# Patient Record
(demographics unavailable — no encounter records)

---

## 2024-11-01 NOTE — FAMILY HISTORY
[FreeTextEntry1] : Father: Depression (not formerly diagnosed), anger, alcohol abuse \par  Mother: Anxiety, Borderline Personality Disorder.\par  No History of Suicide attempts in the family.

## 2024-11-01 NOTE — PAST MEDICAL HISTORY
[FreeTextEntry1] : One suicide attempt via overdose on Tylenol, one hospitalization [facility], without prior history of psychological or psychiatric treatment. After hospitalization at [facility], participated in DBT program and continued therapy with Alma Delia Hernandez LCSW, after which her care was transferred to resident Dr. Smith. Patient's care then transferred to Dr. Teresa.\par  \par  Medical: Chiari Malformation (with surgical intervention at age 19, with prior sx of headaches, pain with neck movements), thyroid nodules requiring biopsy, allergies with hives, ovarian cysts, current idiopathic thrombocytopenia (ITP).

## 2024-11-01 NOTE — DISCUSSION/SUMMARY
[Plan Review] : Plan Review [Able to manage surrounding demands and opportunities] : able to manage surrounding demands and opportunities [Able to exercise self-direction] : able to exercise self-direction [Able to set and pursue goals] : able to set and pursue goals [Adherent to treatment recommendations] : adherent to treatment recommendations [Articulate] : articulate [Attempting to realize their potential] : Attempting to realize their potential [Cognitively intact] : cognitively intact [Insightful] : insightful [Creative] : creative [Intelligent] : intelligent [Motivated to participate in treatment] : motivated to participate in treatment [Motivated and ready for change] : motivated and ready for change [Health literacy] : health literacy [Motivated to maintain or improve physical health] : motivated to maintain or improve physical health [In good health] : in good health [Financially stable] : financially stable [Has vocational interests or hobbies] : has vocational interests or hobbies [Has a supportive network] : has a supportive network [Housing stability] : housing stability [English fluency] : English fluency [Connected to healthcare] : connected to healthcare [Good health literacy] : good health literacy [Access to safe outdoor spaces] : access to safe outdoor spaces [Social supports] : social supports [FreeTextEntry2] : 10/30/24 [FreeTextEntry5] : To continue using my DBT stills so that I don't sink back to maladaptive habits or thinking. [Mental Health] : Mental Health [Interpersonal Relationships] : Interpersonal Relationships [Initial] : Initial [every ___ months] : every [unfilled] months [Psychiatric Provider/Prescriber] : Psychiatric Provider/Prescriber [FreeTextEntry4] : To be able to manage when my sister does not understand me. [FreeTextEntry1] : Pt left voicemail stating that she needed to cancel 10 am appointment on  of death in family and  proceedings. Provider left pt voicemail offering Nov 8at 9am and  at 9:30 am as alternatives

## 2024-11-01 NOTE — SOCIAL HISTORY
[Alone] : lives alone [Employed] : employed [Never ] : never  [FreeTextEntry2] : works as   [FreeTextEntry1] : Currently domiciled alone in [facility] residence, 1 sister (who has 2 children) with strained relationship. 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  [No Known Substance Use] : no known substance use

## 2024-11-01 NOTE — CURRENT PSYCHIATRIC SYMPTOMS
[Depressed Mood] : no depressed mood [Anhedonia] : no anhedonia [Guilt] : not feeling guilty [Decreased Concentration] : no decrease in concentrating ability [Insomnia] : no insomnia disorder [Hypersomnia] : no ~T hypersomnia [Psychomotor Retardation] : no psychomotor retardation [Euphoria] : no euphoria [Highly Irritable] : no high irritability [Increased Activity] : no increased in activity [Distractibility] : not distracted [Grandeur] : no feelings of grandeur [Delusions] : no ~T delusions [Hallucination Visual] : no visual hallucinations [Hallucination Auditory] : no auditory hallucinations [Thought Disorder] : ~T a thought disorder was not noted [Excessive Worry] : no excessive worries [Ruminations] : ruminations [Obsessions] : no obsessions [Panic] : no panic disorder

## 2024-11-01 NOTE — REASON FOR VISIT
[Patient preference] : as per patient preference [Telehealth (audio & video) - Individual/Group] : This visit was provided via telehealth using real-time 2-way audio visual technology. [Medical Office: ([facility])___] : The provider was located at the medical office in [unfilled]. [Home] : The patient, [unfilled], was located at home, [unfilled], at the time of the visit. [Verbal consent obtained from patient/other participant(s)] : Verbal consent for telehealth/telephonic services obtained from patient/other participant(s) [FreeTextEntry4] : 11:04 [FreeTextEntry5] : 11:30 [Patient] : Patient [Follow-Up Visit] : a follow-up visit [FreeTextEntry1] : Therapy and medication management

## 2024-11-01 NOTE — HISTORY OF PRESENT ILLNESS
[FreeTextEntry1] : Pt assessed over [facility] for 26 minutes.  Patient appears well groomed, pleasant and euthymic to interview. She states that she feels "better now", stating that for the past 1.5 weeks her mood has been good and stable, but that prior to that she had 1-2 weeks of what she describes as a "borderline episode." Discussed that much of this was triggered by a budidng romantic relationship, and her fears of abandonment that were triggered by it. Writer provided supportive therapy. Patient states that she now is back to her workout routine, feels somewhat better. Mentions that she feels like her Wellbutrin does not give her the energy/motivation boost it used to ; discussed option of possibly changing medication in the future but also the importance of behavioral activation and exercise. Patient denies any acute changes in her mood, denies any symptoms of decompensated ailyn, psychosis, or depression. Denies any NSSIB or SI. Denies any adverse effects on her current medication.

## 2024-11-01 NOTE — PAST MEDICAL HISTORY
[FreeTextEntry1] : One suicide attempt via overdose on Tylenol, one hospitalization [facility], without prior history of psychological or psychiatric treatment. After hospitalization at [facility], participated in DBT program and continued therapy with Alma Delia Hernandez LCSW, after which her care was transferred to resident Dr. Smith. Patient's care then transferred to Dr. Teresa.\par  \par  Medical: Chiari Malformation (with surgical intervention at age 19, with prior sx of headaches, pain with neck movements), thyroid nodules requiring biopsy, allergies with hives, ovarian cysts, current idiopathic thrombocytopenia (ITP).

## 2024-11-01 NOTE — PHYSICAL EXAM
[None] : none [FreeTextEntry2] : not assessed, telado visit [FreeTextEntry3] : not assessed, teladoc visit [Well groomed] : well groomed [Cooperative] : cooperative [Euthymic] : euthymic [Full] : full [Clear] : clear [Linear/Goal Directed] : linear/goal directed [None Reported] : none reported [Average] : average [WNL] : within normal limits [FreeTextEntry1] : well groomed, good eye contact, NAD [FreeTextEntry7] : self reflective, self aware

## 2024-11-01 NOTE — REASON FOR VISIT
[Patient preference] : as per patient preference [Telehealth (audio & video) - Individual/Group] : This visit was provided via telehealth using real-time 2-way audio visual technology. [Medical Office: ([facility])___] : The provider was located at the medical office in [unfilled]. [Home] : The patient, [unfilled], was located at home, [unfilled], at the time of the visit. [Verbal consent obtained from patient/other participant(s)] : Verbal consent for telehealth/telephonic services obtained from patient/other participant(s) [FreeTextEntry4] : 11:04 [FreeTextEntry5] : 11:30 [Patient] : Patient [Follow-Up Visit] : a follow-up visit [FreeTextEntry1] : Therapy and medication management

## 2024-11-01 NOTE — HISTORY OF PRESENT ILLNESS
[FreeTextEntry1] : Pt assessed over [facility] for 26 minutes.  Patient appears well groomed, pleasant and euthymic to interview. She states that she feels "better now", stating that for the past 1.5 weeks her mood has been good and stable, but that prior to that she had 1-2 weeks of what she describes as a "borderline episode." Discussed that much of this was triggered by a budidng romantic relationship, and her fears of abandonment that were triggered by it. Writer provided supportive therapy. Patient states that she now is back to her workout routine, feels somewhat better. Mentions that she feels like her Wellbutrin does not give her the energy/motivation boost it used to ; discussed option of possibly changing medication in the future but also the importance of behavioral activation and exercise. Patient denies any acute changes in her mood, denies any symptoms of decompensated ailyn, psychosis, or depression. Denies any NSSIB or SI. Denies any adverse effects on her current medication.

## 2024-11-01 NOTE — RESULTS/DATA
[FreeTextEntry1] : 12/2/19: wnl: CBC, CMP, TSH, utox negative\par  12/2/19: H&P performed inpatient, copy in chart

## 2024-11-01 NOTE — SOCIAL HISTORY
[Alone] : lives alone [Employed] : employed [Never ] : never  [FreeTextEntry2] : works as   [FreeTextEntry1] : Currently domiciled alone in [facility] residence, 1 sister (who has 2 children) with strained relationship. 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  [No Known Substance Use] : no known substance use

## 2024-11-25 NOTE — SOCIAL HISTORY
[Alone] : lives alone [Employed] : employed [Never ] : never  [No Known Substance Use] : no known substance use [FreeTextEntry2] : works as   [FreeTextEntry1] : Currently domiciled alone in [facility] residence, 1 sister (who has 2 children) with strained relationship. 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

## 2024-11-25 NOTE — DISCUSSION/SUMMARY
[Plan Review] : Plan Review [Able to manage surrounding demands and opportunities] : able to manage surrounding demands and opportunities [Able to exercise self-direction] : able to exercise self-direction [Able to set and pursue goals] : able to set and pursue goals [Adherent to treatment recommendations] : adherent to treatment recommendations [Articulate] : articulate [Attempting to realize their potential] : Attempting to realize their potential [Cognitively intact] : cognitively intact [Insightful] : insightful [Creative] : creative [Intelligent] : intelligent [Motivated to participate in treatment] : motivated to participate in treatment [Motivated and ready for change] : motivated and ready for change [Health literacy] : health literacy [Motivated to maintain or improve physical health] : motivated to maintain or improve physical health [In good health] : in good health [Financially stable] : financially stable [Has vocational interests or hobbies] : has vocational interests or hobbies [Has a supportive network] : has a supportive network [Housing stability] : housing stability [English fluency] : English fluency [Connected to healthcare] : connected to healthcare [Good health literacy] : good health literacy [Access to safe outdoor spaces] : access to safe outdoor spaces [Social supports] : social supports [Mental Health] : Mental Health [Interpersonal Relationships] : Interpersonal Relationships [Initial] : Initial [every ___ months] : every [unfilled] months [Psychiatric Provider/Prescriber] : Psychiatric Provider/Prescriber [FreeTextEntry2] : 10/30/24 [FreeTextEntry5] : To continue using my DBT stills so that I don't sink back to maladaptive habits or thinking. [FreeTextEntry4] : To be able to manage when my sister does not understand me. [FreeTextEntry1] : Soledad Hawkins is a 37-year-old, female, domiciled alone in [facility] residence, single, without children, employed , with medical history of thrombocytopenia; psychiatric history of Borderline Personality Disorder, Major Depressive Disorder, hx of suicide attempt; presenting for therapy session and medication management.  She continues to work on acceptance and distress tolerance, especially towards acceptance of her family and their behaviors/actions. Patient demonstrates continued use of DBT skills. She continues to demonstrate strong insight into borderline personality disorder and uses her skills to manage interpersonal difficulties, distress tolerance and anxiety. Self-reflective on how far she has come and is proud of sustained ability to use skills to tolerate stress. Denies anxiety, depression, SI, HI. Reviewed coping skills and some distress tolerance skills.  On follow up today mentions feeling "ok" but noting some increased stressors in the past month including the anniversary of her SA, the upcoming holidays, and her grandmother's death. Used supportive therapy to discuss. Notes recent latent EBV diagnosis may have been contributing to her fatigue. Advocates to eventually decrease and later discontinue her psychiatric medications, discussed to try doing this after the holidays. Patient continues to be very future oriented and treatment focused. .Denies any AE on current medication regimen.   Goal: continued work towards distress tolerance and acceptance, plan to discuss small habitual changes to change her behavior in ways that she would like in regards to her finances, exercise, and eating habits  Plan: Medications need to be prescribed by a Medicaid provider Continue venlafaxine ER HCl 225 mg daily  S/p Sertraline 100 mg PO daily  Continue Trazodone 50 mg PRN qhs for insomnia (takes it several nights a week) Continue Wellbutrin  mg PO Qdaily (prescribed as 300mg + 150mg for insurance reasons); states Wellbutrin is not providing her with the "boost" it used to, may consider changing in the future Discuss taking Omega 3 fatty acids Reviewed CBC, CMP, vitamin D, iron levels, and TSH/T4. Vitamin D is lower end of nml.  Follow up in 4 weeks

## 2024-11-25 NOTE — HISTORY OF PRESENT ILLNESS
[FreeTextEntry1] : Pt assessed over [facility] for 28 minutes.  Patient appears well groomed, pleasant  but mildly dysthymic to interview. She states that she feels "ok" but notes that she had a number of challenges in the past few months, including the death of her grandmother about a month ago, and just now the 5-year anniversary of her suicide attempt. She states that she tries not to dwell on the suicide attempt, because it makes her sad, but that it's hard to think of where she was at that time emotionally and not feel brought down. She does state that she continues to be able to enjoy life, and cope. She states that she has been able to go to work, and get the things done that she needs to. She notes however, that the holiday periods can be a little more stressful for her because of the complicated family dynamics they can bring up. She states that she is planning on having a "low key" Thanksgiving to herself. Finally, she notes that she was diagnosed with latent EBV which she believes may have been contributing to her prior fatigue.  Advocates to eventually decrease and later discontinue her psychiatric medications, discussed to try doing this after the holidays. Patient denies any symptoms of decompensated ailyn, psychosis, or depression. Denies any NSSIB or SI. Denies any adverse effects on her current medication.

## 2024-11-25 NOTE — CURRENT PSYCHIATRIC SYMPTOMS
[Ruminations] : ruminations [Depressed Mood] : no depressed mood [Anhedonia] : no anhedonia [Guilt] : not feeling guilty [Decreased Concentration] : no decrease in concentrating ability [Insomnia] : no insomnia disorder [Hypersomnia] : no ~T hypersomnia [Psychomotor Retardation] : no psychomotor retardation [Euphoria] : no euphoria [Highly Irritable] : no high irritability [Increased Activity] : no increased in activity [Distractibility] : not distracted [Grandeur] : no feelings of grandeur [Delusions] : no ~T delusions [Hallucination Visual] : no visual hallucinations [Hallucination Auditory] : no auditory hallucinations [Thought Disorder] : ~T a thought disorder was not noted [Excessive Worry] : no excessive worries [Obsessions] : no obsessions [Panic] : no panic disorder

## 2024-11-25 NOTE — REASON FOR VISIT
[Patient preference] : as per patient preference [Telehealth (audio & video) - Individual/Group] : This visit was provided via telehealth using real-time 2-way audio visual technology. [Medical Office: ([facility])___] : The provider was located at the medical office in [unfilled]. [Home] : The patient, [unfilled], was located at home, [unfilled], at the time of the visit. [Verbal consent obtained from patient/other participant(s)] : Verbal consent for telehealth/telephonic services obtained from patient/other participant(s) [Patient] : Patient [Follow-Up Visit] : a follow-up visit [FreeTextEntry4] : 1:02 [FreeTextEntry5] : 1:30 [FreeTextEntry1] : Therapy and medication management

## 2024-11-25 NOTE — PHYSICAL EXAM
[None] : none [Well groomed] : well groomed [Cooperative] : cooperative [Euthymic] : euthymic [Full] : full [Clear] : clear [Linear/Goal Directed] : linear/goal directed [None Reported] : none reported [Average] : average [WNL] : within normal limits [FreeTextEntry2] : not assessed, telado visit [FreeTextEntry3] : not assessed, teladoc visit [FreeTextEntry1] : well groomed, good eye contact, NAD [FreeTextEntry7] : self reflective, self aware

## 2024-11-25 NOTE — PAST MEDICAL HISTORY
[FreeTextEntry1] : One suicide attempt via overdose on Tylenol, one hospitalization [facility], without prior history of psychological or psychiatric treatment. After hospitalization at [facility], participated in DBT program and continued therapy with Alma Delia Hernandez LCSW, after which her care was transferred to resident Dr. Smith. Patient's care then transferred to Dr. Teresa.\par  \par  Medical: Chiari Malformation (with surgical intervention at age 19, with prior sx of headaches, pain with neck movements), thyroid nodules requiring biopsy, allergies with hives, ovarian cysts, current idiopathic thrombocytopenia (ITP).

## 2024-12-23 NOTE — SOCIAL HISTORY
[Alone] : lives alone [Employed] : employed [Never ] : never  [No Known Substance Use] : no known substance use [FreeTextEntry2] : works as   [FreeTextEntry1] : Currently domiciled alone in [facility] residence, 1 sister (who has 2 children) with strained relationship. 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

## 2024-12-23 NOTE — PAST MEDICAL HISTORY
Primary Provider:  Davie Davenport MD      History of Present Illness:  22 y/o [place] 50 presents for scheduled procedure. C/o abnormal uterine bleeding - \"I have been bleeding for months. \" Daily VB for the past 5-6 months despite CLAUDIA. This has recently improved to bleeding every 3 days s/p EMB 3/9/17. Bleeding increases as expected during OCP withdrawal.     Has been on OCPs since . Compliant w/ use. On this particular pill nearly 4 months. Finishing current pack and planning to switch to Chamizo as prescribed at last visit. AUB previously relieved w/ 1 month of climara 0.1. Tried this recently but the bleeding worsened. Menstrual cramping worse on current OCP. Not using condoms. No h/o STDs. Has had 2 partners. Labs 11: A positive, Ab negative  Labs 4/7/15: TSH 2.13, ferritin 118, H/H 14.6, 42,   Labs 6/16/15: VW Ag 122, VW activity 114, PT 11, PTT 31.5, INR 1  GC/CT 10/24/16: neg/neg  Labs 16: WBC 9.7, H/H 14.6/43, , TSH 2.85    Pelvic US 16: Uterus 6.9cm, anteverted, homogeneous. EM 12mm. ROV 2.6cm. LOV 2.5cm. No free fluid. EMB 3/9/17: POLYPOID FRAGMENTS OF PROLIFERATIVE TYPE ENDOMETRIUM SUGGESTIVE OF  BENIGN ENDOMETRIAL POLYPS. NO HYPERPLASIA OR CARCINOMA. Pelvic US 3/22/17: Uterus 7.3 cm, anteverted. Endometrium 1.8 mm. Normal ovaries. No free fluid. Vital Signs   21Years Old Female  Height:  62 inches    Past Pregnancy History   : 1  Para:     0  Aborta:  1  Term: 0, Premature: 0, Living Children: 0, Vaginal Deliveries: 0, C-Sections: 0, Elect. Ab: 1, Ectopics: 0    Gynecologic History   History of abnormal pap: no  Gardasil Injection History: Complete  Pt currently sexually active: yes  Current Contraception: Oral Contraception.    History of STD: no     Allergies    KEFLEX (Critical)      Medications Removed from Medication List          Past Medical History:     Reviewed history from 2017 and no changes required:        Low Vitamin D    Past Surgical History:     Reviewed history from 03/09/2017 and no changes required:        EAB x1 2011        3 foot surgeries 2013,2014        Keldron Teeth    Family History Summary:      Reviewed history Last on 03/22/2017 and no changes required:05/01/2017      General Comments - FH:  Family history transferred to [address] [place] [address]      Social History:     Reviewed history from 03/09/2017 and no changes required:        Single        works at [employer].        See HPI    Except as noted in the HPI, the review of systems is negative for General, Breast, , Resp, GI, Endo, MS, Psych and Heme. General Medical Physical Exam:     General Appearance:       well developed, well nourished, in no acute distress    Head: Inspection:  normocephalic without obvious abnormalities    Eyes:        External:  EOM intact    Ears, Nose, Throat:        External:  normocephalic and atraumatic       Hearing:  grossly intact    Neck:        Neck:  supple    Respiratory:        Resp. effort:  no use of accessory muscles       Auscultation:   no rales, rhonchi, or wheezes    Cardiovascular: Auscultation:   normal S1 and  S2; no murmur, rub, or gallop       Peripheral circ: no cyanosis, clubbing, or edema    Gastrointestinal:        Abdomen:  soft and non-tender; no masses       Liver/spleen:   no enlargement or nodularity    Musculoskeletal:        Gait/station:  normal gait    Skin:        Inspection:  no rashes, suspicious lesions, or ulcerations    Neurological:        Cranial N:  II - XII grossly intact    Psychiatric:       Judgement:  intact       Mood/affect:  no appearance of anxiety, depression, or agitation              Impression & Recommendations:    Problem # 1:  Dysfunctional uterine bleeding (ICD-626.8) (RSP13-Z37.8)  Reviewed HPI, labs, EMB and pelvic US. Some discrepancy between EMB (benign polyps) and pelvic US (EM 1.8mm).    Discussed expectant management for 3-4 months while acclimating to new CLAUDIA vs hysteroscopy/D&C/possible polypectomy. Pt interested in proceeding w/ procedure. Discussed the risk of surgery including infection, hematoma, bleeding, recurrence or persistence of symptoms or need for more extensive surgery, and the risks of anesthesia including myocardial infarction, cerebrovascular accident, sudden death, or reaction to anesthetic medications. The patient understands that the procedure could be an open procedure. The patient expresses her understanding and all questions were answered to the patient's satisfaction. She freely signed the consent. Anticipated post-op course and instructions reviewed. Medications (at conclusion of this visit)    03/09/2017 KARIVA 0.15-0.02/0.01 MG (21/5) ORAL TABS (DESOGESTREL-ETHINYL ESTRADIOL) 1 PO Qday  03/09/2017 DASETTA 1/35 1-35 MG-MCG ORAL TABS (NORETHINDRONE-ETH ESTRADIOL) Prescribed by non 606/706 Richie Shelley MD          Electronically signed by Moi Pierre MD on 05/01/2017 at 1:40 PM    ________________________________________________________________________    Date of Surgery Update:  Chloe Campoverde was seen and examined. History and physical has been reviewed. The patient has been examined.  There have been no significant clinical changes since the completion of the originally dated History and Physical.    Signed By: Moi Pierre MD     May 3, 2017 7:56 AM [FreeTextEntry1] : One suicide attempt via overdose on Tylenol, one hospitalization [facility], without prior history of psychological or psychiatric treatment. After hospitalization at [facility], participated in DBT program and continued therapy with Alma Delia Hernandez LCSW, after which her care was transferred to resident Dr. Smith. Patient's care then transferred to Dr. Teresa.\par  \par  Medical: Chiari Malformation (with surgical intervention at age 19, with prior sx of headaches, pain with neck movements), thyroid nodules requiring biopsy, allergies with hives, ovarian cysts, current idiopathic thrombocytopenia (ITP).

## 2024-12-23 NOTE — DISCUSSION/SUMMARY
[Plan Review] : Plan Review [Able to manage surrounding demands and opportunities] : able to manage surrounding demands and opportunities [Able to exercise self-direction] : able to exercise self-direction [Able to set and pursue goals] : able to set and pursue goals [Adherent to treatment recommendations] : adherent to treatment recommendations [Articulate] : articulate [Attempting to realize their potential] : Attempting to realize their potential [Cognitively intact] : cognitively intact [Insightful] : insightful [Creative] : creative [Intelligent] : intelligent [Motivated to participate in treatment] : motivated to participate in treatment [Motivated and ready for change] : motivated and ready for change [Health literacy] : health literacy [Motivated to maintain or improve physical health] : motivated to maintain or improve physical health [In good health] : in good health [Financially stable] : financially stable [Has vocational interests or hobbies] : has vocational interests or hobbies [Has a supportive network] : has a supportive network [Housing stability] : housing stability [English fluency] : English fluency [Connected to healthcare] : connected to healthcare [Good health literacy] : good health literacy [Access to safe outdoor spaces] : access to safe outdoor spaces [Social supports] : social supports [Mental Health] : Mental Health [Interpersonal Relationships] : Interpersonal Relationships [Initial] : Initial [every ___ months] : every [unfilled] months [Psychiatric Provider/Prescriber] : Psychiatric Provider/Prescriber [FreeTextEntry2] : 12/23/25 [FreeTextEntry5] : To continue using my DBT stills so that I don't sink back to maladaptive habits or thinking. [FreeTextEntry4] : To be able to manage when my sister does not understand me. [FreeTextEntry1] : Soledad Hawkins is a 37-year-old, female, domiciled alone in [facility], single, without children, employed , with medical history of thrombocytopenia; psychiatric history of Borderline Personality Disorder, Major Depressive Disorder, hx of suicide attempt; presenting for therapy session and medication management.  She continues to work on acceptance and distress tolerance, especially towards acceptance of her family and their behaviors/actions. Patient demonstrates continued use of DBT skills. She continues to demonstrate strong insight into borderline personality disorder and uses her skills to manage interpersonal difficulties, distress tolerance and anxiety. Self-reflective on how far she has come and is proud of sustained ability to use skills to tolerate stress. Denies anxiety, depression, SI, HI. Reviewed coping skills and some distress tolerance skills.  On follow up today mentions feeling better, has been introspective about what a meaningful life would be for her, and feels like she's gained some emotional distance from the painful events of the recent months (tension with friend/relationship, anniversary of SA, grandmother's death) Used supportive therapy to discuss. Advocates to eventually decrease and later discontinue her psychiatric medications, discussed to try doing this after the holidays. Patient continues to be very future oriented and treatment focused. .Denies any AE on current medication regimen.   Goal: continued work towards distress tolerance and acceptance, plan to discuss small habitual changes to change her behavior in ways that she would like in regards to her finances, exercise, and eating habits  Plan: Medications need to be prescribed by a Medicaid provider Continue venlafaxine ER HCl 225 mg daily  S/p Sertraline 100 mg PO daily  Continue Trazodone 50 mg PRN qhs for insomnia (takes it several nights a week) Continue Wellbutrin  mg PO Qdaily (prescribed as 300mg + 150mg for insurance reasons); states Wellbutrin is not providing her with the "boost" it used to, may consider changing in the future Discuss taking Omega 3 fatty acids Reviewed CBC, CMP, vitamin D, iron levels, and TSH/T4. Vitamin D is lower end of nml.  Follow up in 4 weeks

## 2024-12-23 NOTE — REASON FOR VISIT
[Patient preference] : as per patient preference [Telehealth (audio & video) - Individual/Group] : This visit was provided via telehealth using real-time 2-way audio visual technology. [Medical Office: ([facility])___] : The provider was located at the medical office in [unfilled]. [Home] : The patient, [unfilled], was located at home, [unfilled], at the time of the visit. [Verbal consent obtained from patient/other participant(s)] : Verbal consent for telehealth/telephonic services obtained from patient/other participant(s) [Patient] : Patient [Follow-Up Visit] : a follow-up visit [FreeTextEntry4] : 1:34 [FreeTextEntry5] : 1:55 [FreeTextEntry1] : Therapy and medication management

## 2024-12-23 NOTE — HISTORY OF PRESENT ILLNESS
[FreeTextEntry1] : Pt assessed over [facility] for 27 minutes.  Patient appears well groomed, pleasant  but and more euthymic than last visit to interview. She states that she has been feeling better, both in terms of her relationship/friendship, and the anniversary of her suicide attempt. States that she has been thinking about what it means to live a full, meaningful life and that it does not necessarily include having a romantic partner (is receiving a lot of pressure from family to find someone). Has been engaged in DBT podcsts such as [facility], and finding that work inspirational (thinks she may want to contribute to the BPD/mental health community one day). Again advocates to eventually decrease and later discontinue her psychiatric medications, discussed to try doing this after the holidays. Patient denies any symptoms of decompensated ailyn, psychosis, or depression. Denies any NSSIB or SI. Denies any adverse effects on her current medication.

## 2025-01-17 NOTE — SOCIAL HISTORY
[Alone] : lives alone [Employed] : employed [Never ] : never  [No Known Substance Use] : no known substance use [FreeTextEntry2] : works as   [FreeTextEntry1] : Currently domiciled alone in [facility] residence, 1 sister (who has 2 children) with strained relationship. 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

## 2025-01-17 NOTE — REASON FOR VISIT
[Patient preference] : as per patient preference [Telehealth (audio & video) - Individual/Group] : This visit was provided via telehealth using real-time 2-way audio visual technology. [Medical Office: ([facility])___] : The provider was located at the medical office in [unfilled]. [Home] : The patient, [unfilled], was located at home, [unfilled], at the time of the visit. [Verbal consent obtained from patient/other participant(s)] : Verbal consent for telehealth/telephonic services obtained from patient/other participant(s) [Patient] : Patient [Follow-Up Visit] : a follow-up visit [FreeTextEntry4] : 11:00 [FreeTextEntry5] : 11:30 [FreeTextEntry1] : Therapy and medication management

## 2025-01-17 NOTE — HISTORY OF PRESENT ILLNESS
[FreeTextEntry1] : Pt assessed over [employer] for 30 minutes.  Patient appears well groomed, pleasant  but more dysthymic and constricted than on last visit. She states that though she does not feel actively depressed, she continues to feel like she has low motivation, and does not have figueroa in engaging in the kinds of activities that used to give her figueroa. She states that she has been trying to engage exercise and the other activities that usually work to get her out of this mood state, but that it's been stable for the past couple of months and does not seem to be changing. Provider discussed role of omega 3 fatty acids. Patient ammendable to switching Effexor to Prozac. Patient denies any symptoms of decompensated ailyn, psychosis, or depression. Denies any NSSIB or SI. Denies any adverse effects on her current medication.

## 2025-01-17 NOTE — CURRENT PSYCHIATRIC SYMPTOMS
[Ruminations] : ruminations [Depressed Mood] : no depressed mood [Anhedonia] : no anhedonia [Guilt] : not feeling guilty [Decreased Concentration] : no decrease in concentrating ability [Insomnia] : no insomnia disorder [Hypersomnia] : no ~T hypersomnia [Psychomotor Agitation] : no agitation [Psychomotor Retardation] : no psychomotor retardation [Euphoria] : no euphoria [Highly Irritable] : no high irritability [Increased Activity] : no increased in activity [Distractibility] : not distracted [Grandeur] : no feelings of grandeur [Delusions] : no ~T delusions [Hallucination Visual] : no visual hallucinations [Hallucination Auditory] : no auditory hallucinations [Thought Disorder] : ~T a thought disorder was not noted [Excessive Worry] : no excessive worries [Obsessions] : no obsessions [Panic] : no panic disorder [employer]

## 2025-01-17 NOTE — PAST MEDICAL HISTORY
[FreeTextEntry1] : One suicide attempt via overdose on Tylenol, one hospitalization [facility], without prior history of psychological or psychiatric treatment. After hospitalization at [facility], participated in DBT program and continued therapy with Alma Delia Hernandez LCSW, after which her care was transferred to resident Dr. Smith. Patient's care then transferred to Dr. Teresa.\par  \par  Medical: Chiari Malformation (with surgical intervention at age 19, with prior sx of headaches, pain with neck movements), thyroid nodules requiring biopsy, allergies with hives, ovarian cysts, current idiopathic thrombocytopenia (ITP).

## 2025-01-17 NOTE — PHYSICAL EXAM
[None] : none [Well groomed] : well groomed [Cooperative] : cooperative [Clear] : clear [Linear/Goal Directed] : linear/goal directed [None Reported] : none reported [Average] : average [WNL] : within normal limits [FreeTextEntry2] : not assessed, telado visit [FreeTextEntry3] : not assessed, [employer]adoc visit [Constricted] : constricted [FreeTextEntry1] : well groomed, good eye contact, NAD [FreeTextEntry8] : Dysthymic, anhedonic [FreeTextEntry7] : self reflective, self aware

## 2025-01-17 NOTE — DISCUSSION/SUMMARY
[Plan Review] : Plan Review [Able to manage surrounding demands and opportunities] : able to manage surrounding demands and opportunities [Able to exercise self-direction] : able to exercise self-direction [Able to set and pursue goals] : able to set and pursue goals [Adherent to treatment recommendations] : adherent to treatment recommendations [Articulate] : articulate [Attempting to realize their potential] : Attempting to realize their potential [Cognitively intact] : cognitively intact [Insightful] : insightful [Creative] : creative [Intelligent] : intelligent [Motivated to participate in treatment] : motivated to participate in treatment [Motivated and ready for change] : motivated and ready for change [Health literacy] : health literacy [Motivated to maintain or improve physical health] : motivated to maintain or improve physical health [In good health] : in good health [Financially stable] : financially stable [Has vocational interests or hobbies] : has vocational interests or hobbies [Has a supportive network] : has a supportive network [Housing stability] : housing stability [English fluency] : English fluency [Connected to healthcare] : connected to healthcare [Good health literacy] : good health literacy [Access to safe outdoor spaces] : access to safe outdoor spaces [Social supports] : social supports [Mental Health] : Mental Health [Interpersonal Relationships] : Interpersonal Relationships [Initial] : Initial [every ___ months] : every [unfilled] months [Psychiatric Provider/Prescriber] : Psychiatric Provider/Prescriber [FreeTextEntry2] : 12/23/25 [FreeTextEntry5] : To continue using my DBT stills so that I don't sink back to maladaptive habits or thinking. [FreeTextEntry4] : To be able to manage when my sister does not understand me. [FreeTextEntry1] : Soledad Hawkins is a 37-year-old, female, domiciled alone in [facility], single, without children, employed , with medical history of thrombocytopenia; psychiatric history of Borderline Personality Disorder, Major Depressive Disorder, hx of suicide attempt; presenting for therapy session and medication management.  She continues to work on acceptance and distress tolerance, especially towards acceptance of her family and their behaviors/actions. Patient demonstrates continued use of DBT skills. She continues to demonstrate strong insight into borderline personality disorder and uses her skills to manage interpersonal difficulties, distress tolerance and anxiety. Self-reflective on how far she has come and is proud of sustained ability to use skills to tolerate stress. Denies anxiety, depression, SI, HI. Reviewed coping skills and some distress tolerance skills.  On follow up today pt appears more dysthymic and constricted than on last visit. She states that though she does not feel actively depressed, she has low motivation and anhedonia. Provider discussed role of omega 3 fatty acids. Patient amendable to switching Effexor to Prozac, risks and benefits discussed.  Patient continues to be very future oriented and treatment focused. .Denies any AE on current medication regimen.   Goal: continued work towards distress tolerance and acceptance, plan to discuss small habitual changes to change her behavior in ways that she would like in regards to her finances, exercise, and eating habits  Plan: Medications need to be prescribed by a Medicaid provider Decrese venlafaxine ER HCl to 150 mg daily (plan to discontinue at next appointment) Start Prozac 20 mg daily (plan to increase to 40 mg at next appointment) Continue Trazodone 50 mg PRN qhs for insomnia (takes it several nights a week) Continue Wellbutrin  mg PO Qdaily (prescribed as 300mg + 150mg for insurance reasons); states Wellbutrin is not providing her with the "boost" it used to, may consider changing in the future Discussed taking Omega 3 fatty acids S/p Sertraline 100 mg PO daily  Reviewed CBC, CMP, vitamin D, iron levels, and TSH/T4. Vitamin D is lower end of nml.  Follow up in 2 weeks

## 2025-01-31 NOTE — DISCUSSION/SUMMARY
[FreeTextEntry1] : Pt no-show for today's appointment. Writer left  requesting that patient call back to reschedule

## 2025-02-06 NOTE — HISTORY OF PRESENT ILLNESS
[FreeTextEntry1] : Pt assessed over [employer] for 22 minutes.  Patient appears well groomed, pleasant, less dysthymic and constricted than on last visit. She states her mood has been "ok" but notes currently being sick with a URI. She states that she feels her sleep quality has somewhat decreased during the transition to Prozac, but that she is agreeable to continuing the switch Effexor to Prozac. 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 Patient denies any symptoms of decompensated ailyn, psychosis, or depression. Denies any NSSIB or SI. Denies any adverse effects on her current medication.

## 2025-02-06 NOTE — PHYSICAL EXAM
[None] : none [Well groomed] : well groomed [Cooperative] : cooperative [Constricted] : constricted [Clear] : clear [Linear/Goal Directed] : linear/goal directed [None Reported] : none reported [Average] : average [WNL] : within normal limits [FreeTextEntry2] : not assessed, telado visit [FreeTextEntry3] : not assessed, [employer]adoc visit [FreeTextEntry1] : well groomed, good eye contact, NAD [FreeTextEntry8] : "ok" [de-identified] : less constricted and less dysthymic than previously [FreeTextEntry7] : self reflective, self aware

## 2025-02-06 NOTE — PAST MEDICAL HISTORY
[FreeTextEntry1] : One suicide attempt via overdose on Tylenol, one hospitalization [facility], without prior history of psychological or psychiatric treatment. After hospitalization at [facility], participated in DBT program and continued therapy with Alma Delia Hernandez LCSW, after which her care was transferred to resident Dr. Smith. Patient's care then transferred to Dr. Teresa.\par  \par  Medical: Chiari Malformation (with surgical intervention at age 19, with prior sx of headaches, pain with neck movements), thyroid nodules requiring biopsy, allergies with hives, ovarian cysts, current idiopathic thrombocytopenia (ITP).

## 2025-02-06 NOTE — CURRENT PSYCHIATRIC SYMPTOMS
[Ruminations] : ruminations [Depressed Mood] : no depressed mood [Anhedonia] : no anhedonia [Guilt] : not feeling guilty [Decreased Concentration] : no decrease in concentrating ability [Insomnia] : insomnia [Hypersomnia] : no ~T hypersomnia [Psychomotor Retardation] : no psychomotor retardation [Euphoria] : no euphoria [Highly Irritable] : no high irritability [Increased Activity] : no increased in activity [Distractibility] : not distracted [Grandeur] : no feelings of grandeur [Delusions] : no ~T delusions [Hallucination Visual] : no visual hallucinations [Hallucination Auditory] : no auditory hallucinations [Thought Disorder] : ~T a thought disorder was not noted [Excessive Worry] : no excessive worries [Obsessions] : no obsessions [Panic] : no panic disorder

## 2025-02-06 NOTE — REASON FOR VISIT
[Patient preference] : as per patient preference [Telehealth (audio & video) - Individual/Group] : This visit was provided via telehealth using real-time 2-way audio visual technology. [Medical Office: ([facility])___] : The provider was located at the medical office in [unfilled]. [Home] : The patient, [unfilled], was located at home, [unfilled], at the time of the visit. [Verbal consent obtained from patient/other participant(s)] : Verbal consent for telehealth/telephonic services obtained from patient/other participant(s) [Patient] : Patient [Follow-Up Visit] : a follow-up visit [FreeTextEntry4] : 10:38 [FreeTextEntry5] : 11:00 [FreeTextEntry1] : Therapy and medication management

## 2025-02-06 NOTE — SOCIAL HISTORY
[Alone] : lives alone [Employed] : employed [Never ] : never  [No Known Substance Use] : no known substance use [FreeTextEntry2] : works as   [FreeTextEntry1] : Currently domiciled alone in [facility] residence, 1 sister (who has 2 children) with strained relationship. 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

## 2025-02-06 NOTE — DISCUSSION/SUMMARY
[Plan Review] : Plan Review [Able to manage surrounding demands and opportunities] : able to manage surrounding demands and opportunities [Able to exercise self-direction] : able to exercise self-direction [Able to set and pursue goals] : able to set and pursue goals [Adherent to treatment recommendations] : adherent to treatment recommendations [Articulate] : articulate [Attempting to realize their potential] : Attempting to realize their potential [Cognitively intact] : cognitively intact [Insightful] : insightful [Creative] : creative [Intelligent] : intelligent [Motivated to participate in treatment] : motivated to participate in treatment [Motivated and ready for change] : motivated and ready for change [Health literacy] : health literacy [Motivated to maintain or improve physical health] : motivated to maintain or improve physical health [In good health] : in good health [Financially stable] : financially stable [Has vocational interests or hobbies] : has vocational interests or hobbies [Has a supportive network] : has a supportive network [Housing stability] : housing stability [English fluency] : English fluency [Connected to healthcare] : connected to healthcare [Good health literacy] : good health literacy [Access to safe outdoor spaces] : access to safe outdoor spaces [Social supports] : social supports [Mental Health] : Mental Health [Interpersonal Relationships] : Interpersonal Relationships [Initial] : Initial [every ___ months] : every [unfilled] months [Psychiatric Provider/Prescriber] : Psychiatric Provider/Prescriber [FreeTextEntry2] : 12/23/25 [FreeTextEntry5] : To continue using my DBT stills so that I don't sink back to maladaptive habits or thinking. [FreeTextEntry4] : To be able to manage when my sister does not understand me. [FreeTextEntry1] : Soledad Hawkins is a 37-year-old, female, domiciled alone in [facility], single, without children, employed , with medical history of thrombocytopenia; psychiatric history of Borderline Personality Disorder, Major Depressive Disorder, hx of suicide attempt; presenting for therapy session and medication management.  She continues to work on acceptance and distress tolerance, especially towards acceptance of her family and their behaviors/actions. Patient demonstrates continued use of DBT skills. She continues to demonstrate strong insight into borderline personality disorder and uses her skills to manage interpersonal difficulties, distress tolerance and anxiety. Self-reflective on how far she has come and is proud of sustained ability to use skills to tolerate stress. Denies anxiety, depression, SI, HI. Reviewed coping skills and some distress tolerance skills.  On follow up today pt appears less dysthymic and constricted than on last visit. 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 Patient amendable continuing switch from Effexor to Prozac, risks and benefits discussed. At this time will stop Effexor and increase prozac to 40 mg. Patient continues to be very future oriented and treatment focused. .Denies any AE on current medication regimen.   Goal: continued work towards distress tolerance and acceptance, plan to discuss small habitual changes to change her behavior in ways that she would like in regards to her finances, exercise, and eating habits  Plan: Medications need to be prescribed by a Medicaid provider Stop venlafaxine ER HCl  Increase Prozac to 40 mg daily  Continue Trazodone 50 mg PRN qhs for insomnia (takes it several nights a week) Continue Wellbutrin  mg PO Qdaily (prescribed as 300mg + 150mg for insurance reasons); states Wellbutrin is not providing her with the "boost" it used to, may consider changing in the future Discussed taking Omega 3 fatty acids S/p Sertraline 100 mg PO daily  Reviewed CBC, CMP, vitamin D, iron levels, and TSH/T4. Vitamin D is lower end of nml.  Follow up in 3 weeks

## 2025-02-28 NOTE — CURRENT PSYCHIATRIC SYMPTOMS
[Insomnia] : insomnia [Ruminations] : ruminations [Depressed Mood] : no depressed mood [Anhedonia] : no anhedonia [Guilt] : not feeling guilty [Decreased Concentration] : no decrease in concentrating ability [Hypersomnia] : no ~T hypersomnia [Psychomotor Retardation] : no psychomotor retardation [Euphoria] : no euphoria [Highly Irritable] : no high irritability [Increased Activity] : no increased in activity [Distractibility] : not distracted [Grandeur] : no feelings of grandeur [Delusions] : no ~T delusions [Hallucination Visual] : no visual hallucinations [Hallucination Auditory] : no auditory hallucinations [Thought Disorder] : ~T a thought disorder was not noted [Excessive Worry] : no excessive worries [Obsessions] : no obsessions [Panic] : no panic disorder

## 2025-02-28 NOTE — REASON FOR VISIT
[Patient preference] : as per patient preference [Telehealth (audio & video) - Individual/Group] : This visit was provided via telehealth using real-time 2-way audio visual technology. [Medical Office: ([facility])___] : The provider was located at the medical office in [unfilled]. [Home] : The patient, [unfilled], was located at home, [unfilled], at the time of the visit. [Verbal consent obtained from patient/other participant(s)] : Verbal consent for telehealth/telephonic services obtained from patient/other participant(s) [Patient] : Patient [Follow-Up Visit] : a follow-up visit [FreeTextEntry4] : 10:38 [FreeTextEntry5] : 11:00 [FreeTextEntry1] : Therapy and medication management

## 2025-02-28 NOTE — HISTORY OF PRESENT ILLNESS
[FreeTextEntry1] : Pt assessed over Tel[employer] for 22 minutes.  Patient appears well groomed, pleasant, less dysthymic and constricted than on last visit. She states her mood has been "better" and that she has more energy, but that it is still somewhat low. Discussed behavioral strategies for increasing the amount she exercises. Patient denies any symptoms of decompensated ailyn, psychosis, or depression. Denies any NSSIB or SI. Denies any adverse effects on her current medication.

## 2025-02-28 NOTE — PHYSICAL EXAM
[None] : none [Well groomed] : well groomed [Cooperative] : cooperative [Clear] : clear [Linear/Goal Directed] : linear/goal directed [None Reported] : none reported [Average] : average [WNL] : within normal limits [FreeTextEntry2] : not assessed, telado visit [FreeTextEntry3] : not assessed, teladoc visit [Euthymic] : euthymic [Full] : full [FreeTextEntry1] : well groomed, good eye contact, NAD [FreeTextEntry8] : "good" [FreeTextEntry7] : self reflective, self aware [2 - Borderline mentally ill] : 2 - Borderline mentally ill (subtle or suspected pathology) [2 - Much improved] : 2 - Much improved  (notably better with significant reduction of symptoms; increase in the level of functioning but some symptoms remain)

## 2025-02-28 NOTE — SOCIAL HISTORY
[Alone] : lives alone [Employed] : employed [Never ] : never  [No Known Substance Use] : no known substance use [FreeTextEntry2] : works as   [FreeTextEntry1] : Currently domiciled alone in [facility] residence, 1 sister (who has 2 children) with strained relationship. 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

## 2025-02-28 NOTE — PAST MEDICAL HISTORY
[FreeTextEntry1] : One suicide attempt via overdose on Tylenol, one hospitalization [facility], without prior history of psychological or psychiatric treatment. After hospitalization at [facility], participated in DBT program and continued therapy with Alma Delia Hernandez LCSW, after which her care was transferred to resident Dr. Smith. Patient's care then transferred to Dr. Teresa.\par  \par  Medical: Chiari Malformation (with surgical intervention at age 19, with prior sx of headaches, pain with neck movements), thyroid nodules requiring biopsy, allergies with hives, ovarian cysts, current idiopathic thrombocytopenia (ITP).

## 2025-02-28 NOTE — DISCUSSION/SUMMARY
[Plan Review] : Plan Review [Able to manage surrounding demands and opportunities] : able to manage surrounding demands and opportunities [Able to exercise self-direction] : able to exercise self-direction [Able to set and pursue goals] : able to set and pursue goals [Adherent to treatment recommendations] : adherent to treatment recommendations [Articulate] : articulate [Attempting to realize their potential] : Attempting to realize their potential [Cognitively intact] : cognitively intact [Insightful] : insightful [Creative] : creative [Intelligent] : intelligent [Motivated to participate in treatment] : motivated to participate in treatment [Motivated and ready for change] : motivated and ready for change [Health literacy] : health literacy [Motivated to maintain or improve physical health] : motivated to maintain or improve physical health [In good health] : in good health [Financially stable] : financially stable [Has vocational interests or hobbies] : has vocational interests or hobbies [Has a supportive network] : has a supportive network [Housing stability] : housing stability [English fluency] : English fluency [Connected to healthcare] : connected to healthcare [Good health literacy] : good health literacy [Access to safe outdoor spaces] : access to safe outdoor spaces [Social supports] : social supports [Mental Health] : Mental Health [Interpersonal Relationships] : Interpersonal Relationships [Initial] : Initial [every ___ months] : every [unfilled] months [Psychiatric Provider/Prescriber] : Psychiatric Provider/Prescriber [FreeTextEntry2] : 12/23/25 [FreeTextEntry5] : To continue using my DBT stills so that I don't sink back to maladaptive habits or thinking. [FreeTextEntry4] : To be able to manage when my sister does not understand me. [FreeTextEntry1] : Soledad Hawkins is a 37-year-old, female, [facility] [facility] [place] [facility], single, without children, employed , with medical history of thrombocytopenia; psychiatric history of Borderline Personality Disorder, Major Depressive Disorder, hx of suicide attempt; presenting for therapy session and medication management.  She continues to work on acceptance and distress tolerance, especially towards acceptance of her family and their behaviors/actions. Patient demonstrates continued use of DBT skills. She continues to demonstrate strong insight into borderline personality disorder and uses her skills to manage interpersonal difficulties, distress tolerance and anxiety. Self-reflective on how far she has come and is proud of sustained ability to use skills to tolerate stress. Denies anxiety, depression, SI, HI. Reviewed coping skills and some distress tolerance skills.  On follow up today pt appears euthymic, tolerating prozac to 40 mg well. Discussed strategies for building and sustaining habits, especially around exercise.  Patient continues to be very future oriented, and treatment focused. Denies any AE on current medication regimen.   Goal: continued work towards distress tolerance and acceptance, plan to discuss small habitual changes to change her behavior in ways that she would like in regards to her finances, exercise, and eating habits  Plan: Medications need to be prescribed by a Medicaid provider Stop venlafaxine ER HCl  Increase Prozac to 40 mg daily  Continue Trazodone 50 mg PRN qhs for insomnia (takes it several nights a week) Continue Wellbutrin  mg PO Qdaily (prescribed as 300mg + 150mg for insurance reasons); states Wellbutrin is not providing her with the "boost" it used to, may consider changing in the future Discussed taking Omega 3 fatty acids S/p Sertraline 100 mg PO daily  Reviewed CBC, CMP, vitamin D, iron levels, and TSH/T4. Vitamin D is lower end of nml.  Follow up in 3 weeks

## 2025-03-28 NOTE — SOCIAL HISTORY
[Alone] : lives alone [Employed] : employed [Never ] : never  [No Known Substance Use] : no known substance use [FreeTextEntry2] : works as   [FreeTextEntry1] : Currently domiciled alone in [facility] residence, 1 sister (who has 2 children) with strained relationship. 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

## 2025-03-28 NOTE — PHYSICAL EXAM
[None] : none [Well groomed] : well groomed [Cooperative] : cooperative [Euthymic] : euthymic [Full] : full [Clear] : clear [Linear/Goal Directed] : linear/goal directed [None Reported] : none reported [Average] : average [WNL] : within normal limits [2 - Borderline mentally ill] : 2 - Borderline mentally ill (subtle or suspected pathology) [2 - Much improved] : 2 - Much improved  (notably better with significant reduction of symptoms; increase in the level of functioning but some symptoms remain)  [FreeTextEntry2] : not assessed, telado visit [FreeTextEntry3] : not assessed, teladoc visit [FreeTextEntry1] : well groomed, good eye contact, NAD [FreeTextEntry8] : "good" "hard to make myself to stuff" [FreeTextEntry7] : self reflective, self aware

## 2025-03-28 NOTE — HISTORY OF PRESENT ILLNESS
[FreeTextEntry1] : Pt assessed over Teladoc for 30 minutes.   On follow up today pt appears euthymic, tolerating medications well, but continuing to endorse low motivation and energy. Has even more fatigue, increased irritability, short tempered in week prior to period, pt is not on hormonal birth control. Further discussed strategies for building and sustaining habits, especially around exercise.  Discussed potential for starting cytomel vs modafanil. Patient agreeable to obtaining labs prior to next session to R/O underlying medical causes of low energy. Patient continues to be very future oriented, and treatment focused. Denies any AE on current medication regimen.  Patient denies any symptoms of decompensated ailyn, psychosis, or depression. Denies any NSSIB or SI. Denies any adverse effects on her current medication.

## 2025-03-28 NOTE — REASON FOR VISIT
[Patient preference] : as per patient preference [Telehealth (audio & video) - Individual/Group] : This visit was provided via telehealth using real-time 2-way audio visual technology. [Medical Office: ([facility])___] : The provider was located at the medical office in [unfilled]. [Home] : The patient, [unfilled], was located at home, [unfilled], at the time of the visit. [Verbal consent obtained from patient/other participant(s)] : Verbal consent for telehealth/telephonic services obtained from patient/other participant(s) [Patient] : Patient [Follow-Up Visit] : a follow-up visit [FreeTextEntry4] : 10:30 [FreeTextEntry5] : 11:00 [FreeTextEntry1] : Therapy and medication management

## 2025-03-28 NOTE — DISCUSSION/SUMMARY
[Plan Review] : Plan Review [Able to manage surrounding demands and opportunities] : able to manage surrounding demands and opportunities [Able to exercise self-direction] : able to exercise self-direction [Able to set and pursue goals] : able to set and pursue goals [Adherent to treatment recommendations] : adherent to treatment recommendations [Articulate] : articulate [Attempting to realize their potential] : Attempting to realize their potential [Cognitively intact] : cognitively intact [Insightful] : insightful [Creative] : creative [Intelligent] : intelligent [Motivated to participate in treatment] : motivated to participate in treatment [Motivated and ready for change] : motivated and ready for change [Health literacy] : health literacy [Motivated to maintain or improve physical health] : motivated to maintain or improve physical health [In good health] : in good health [Financially stable] : financially stable [Has vocational interests or hobbies] : has vocational interests or hobbies [Has a supportive network] : has a supportive network [Housing stability] : housing stability [English fluency] : English fluency [Connected to healthcare] : connected to healthcare [Good health literacy] : good health literacy [Access to safe outdoor spaces] : access to safe outdoor spaces [Social supports] : social supports [Mental Health] : Mental Health [Interpersonal Relationships] : Interpersonal Relationships [Initial] : Initial [every ___ months] : every [unfilled] months [Psychiatric Provider/Prescriber] : Psychiatric Provider/Prescriber [FreeTextEntry2] : 12/23/25 [FreeTextEntry5] : To continue using my DBT stills so that I don't sink back to maladaptive habits or thinking. [FreeTextEntry4] : To be able to manage when my sister does not understand me. [FreeTextEntry1] : Soledad Hawkins is a 37-year-old, female, domiciled alone in [facility], single, without children, employed , with medical history of thrombocytopenia; psychiatric history of Borderline Personality Disorder, Major Depressive Disorder, hx of suicide attempt; presenting for therapy session and medication management.  She continues to work on acceptance and distress tolerance, especially towards acceptance of her family and their behaviors/actions. Patient demonstrates continued use of DBT skills. She continues to demonstrate strong insight into borderline personality disorder and uses her skills to manage interpersonal difficulties, distress tolerance and anxiety. Self-reflective on how far she has come and is proud of sustained ability to use skills to tolerate stress. Denies anxiety, depression, SI, HI. Reviewed coping skills and some distress tolerance skills.  On follow up today pt appears euthymic, tolerating medications well, but continuing to endorse low motivation and energy. Further discussed strategies for building and sustaining habits, especially around exercise.  Discussed potential for starting cytomel vs modafanil. Patient agreeable to obtaining labs prior to next session to R/O underlying medical causes of low energy. Patient continues to be very future oriented, and treatment focused. Denies any AE on current medication regimen.   Goal: continued work towards distress tolerance and acceptance, plan to discuss small habitual changes to change her behavior in ways that she would like in regards to her finances, exercise, and eating habits  Plan: FU laboratory blood work ordered 3/28 Consider starting cytomel vs modafanil vs stimulant for low energy/motvation and dysthymia Medications need to be prescribed by a Medicaid provider Stop venlafaxine ER HCl  Continue Prozac 40 mg  Continue Trazodone 50 mg PRN qhs for insomnia (takes it several nights a week) Continue Wellbutrin  mg PO Qdaily (prescribed as 300mg + 150mg for insurance reasons); states Wellbutrin is not providing her with the "boost" it used to, may consider changing in the future Discussed taking Omega 3 fatty acids S/p Sertraline 100 mg PO daily,  venlafaxine ER HCl  Follow up in 2 weeks

## 2025-03-28 NOTE — PAST MEDICAL HISTORY
[FreeTextEntry1] : One suicide attempt via overdose on Tylenol, one hospitalization [facility], without prior history of psychological or psychiatric treatment. After hospitalization at [facility], participated in DBT program and continued therapy with Alma Delia Hernandez LCSW, after which her care was transferred to resident Dr. Smith. Patient's care then transferred to Dr. Teresa.\par  \par  Medical: Chiari Malformation (with surgical intervention at age 19, with prior sx of headaches, pain with neck movements), thyroid nodules requiring biopsy, allergies with hives, ovarian cysts, current idiopathic thrombocytopenia (ITP).

## 2025-04-09 NOTE — PAST MEDICAL HISTORY
[FreeTextEntry1] : One suicide attempt via overdose on Tylenol, one hospitalization [facility], without prior history of psychological or psychiatric treatment. After hospitalization at [facility], participated in DBT program and continued therapy with Alma Delia Hernandez LCSW, after which her care was transferred to resident Dr. Smith. Patient's care then transferred to Dr. Teresa.\par  \par  Medical: Chiari Malformation (with surgical intervention at age 19, with prior sx of headaches, pain with neck movements), thyroid nodules requiring biopsy, allergies with hives, ovarian cysts, current idiopathic thrombocytopenia (ITP).

## 2025-04-09 NOTE — SOCIAL HISTORY
[Alone] : lives alone [Employed] : employed [Never ] : never  [No Known Substance Use] : no known substance use [FreeTextEntry2] : works as   [FreeTextEntry1] : Currently domiciled alone in [facility] residence, 1 sister (who has 2 children) with strained relationship. 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

## 2025-04-09 NOTE — HISTORY OF PRESENT ILLNESS
[FreeTextEntry1] : Pt assessed over Teladoc for 30 minutes.   On follow up today pt appears euthymic, tolerating medications well, but continuing to endorse low motivation and energy. States she would like to continue trying behavioral modifications prior to starting a new medication, states she will try obtaining more sunlight in the mornings once the weather improves. Discussed potential benefits of light therapy. States she will track mood and menstrual cycle in order to determine whether pt has PMDD. Labs reviewed with patient. Patient denies any symptoms of decompensated ailyn, psychosis, or depression. Denies any NSSIB or SI. Denies any adverse effects on her current medication.

## 2025-04-09 NOTE — REASON FOR VISIT
[Patient preference] : as per patient preference [Telehealth (audio & video) - Individual/Group] : This visit was provided via telehealth using real-time 2-way audio visual technology. [Medical Office: ([facility])___] : The provider was located at the medical office in [unfilled]. [Home] : The patient, [unfilled], was located at home, [unfilled], at the time of the visit. [Verbal consent obtained from patient/other participant(s)] : Verbal consent for telehealth/telephonic services obtained from patient/other participant(s) [Patient] : Patient [Follow-Up Visit] : a follow-up visit [FreeTextEntry4] : 10:00 [FreeTextEntry5] : 10:30 [FreeTextEntry1] : Therapy and medication management

## 2025-04-09 NOTE — DISCUSSION/SUMMARY
[Plan Review] : Plan Review [Able to manage surrounding demands and opportunities] : able to manage surrounding demands and opportunities [Able to exercise self-direction] : able to exercise self-direction [Able to set and pursue goals] : able to set and pursue goals [Adherent to treatment recommendations] : adherent to treatment recommendations [Articulate] : articulate [Attempting to realize their potential] : Attempting to realize their potential [Cognitively intact] : cognitively intact [Insightful] : insightful [Creative] : creative [Intelligent] : intelligent [Motivated to participate in treatment] : motivated to participate in treatment [Motivated and ready for change] : motivated and ready for change [Health literacy] : health literacy [Motivated to maintain or improve physical health] : motivated to maintain or improve physical health [In good health] : in good health [Financially stable] : financially stable [Has vocational interests or hobbies] : has vocational interests or hobbies [Has a supportive network] : has a supportive network [Housing stability] : housing stability [English fluency] : English fluency [Connected to healthcare] : connected to healthcare [Good health literacy] : good health literacy [Access to safe outdoor spaces] : access to safe outdoor spaces [Social supports] : social supports [Mental Health] : Mental Health [Interpersonal Relationships] : Interpersonal Relationships [Initial] : Initial [every ___ months] : every [unfilled] months [Psychiatric Provider/Prescriber] : Psychiatric Provider/Prescriber [FreeTextEntry2] : 12/23/25 [FreeTextEntry5] : To continue using my DBT stills so that I don't sink back to maladaptive habits or thinking. [FreeTextEntry4] : To be able to manage when my sister does not understand me. [FreeTextEntry1] : Soledad Hawkins is a 37-year-old, female, domiciled alone in [facility], single, without children, employed , with medical history of thrombocytopenia; psychiatric history of Borderline Personality Disorder, Major Depressive Disorder, hx of suicide attempt; presenting for therapy session and medication management.  She continues to work on acceptance and distress tolerance, especially towards acceptance of her family and their behaviors/actions. Patient demonstrates continued use of DBT skills. She continues to demonstrate strong insight into borderline personality disorder and uses her skills to manage interpersonal difficulties, distress tolerance and anxiety. Self-reflective on how far she has come and is proud of sustained ability to use skills to tolerate stress. Denies anxiety, depression, SI, HI. Reviewed coping skills and some distress tolerance skills.  On follow up today pt appears euthymic, tolerating medications well, but continuing to endorse low motivation and energy.. States she would like to continue trying behavioral modifications prior to starting a new medication, states she will try obtaining more sunlight in the mornings once the weather improves. Discussed potential benefits of light therapy. States she will track mood and menstrual cycle in order to determine whether pt has PMDD. Labs reviewed with patient. Patient denies any symptoms of decompensated ailyn, psychosis, or depression. Denies any NSSIB or SI. Denies any adverse effects on her current medication. Patient continues to be very future oriented, and treatment focused. Denies any AE on current medication regimen.   Goal: continued work towards distress tolerance and acceptance, plan to discuss small habitual changes to change her behavior in ways that she would like in regards to her finances, exercise, and eating habits  Plan: Mood and menstrual cycle tracking Consider starting cytomel vs modafanil vs stimulant for low energy/motvation and dysthymia- at this time pt requests to continue behavioral modifications Medications need to be prescribed by a Medicaid provider Continue Prozac 40 mg  Continue Trazodone 50 mg PRN qhs for insomnia (takes it several nights a week) Continue Wellbutrin  mg PO Qdaily (prescribed as 300mg + 150mg for insurance reasons); states Wellbutrin is not providing her with the "boost" it used to, may consider changing in the future Discussed taking Omega 3 fatty acids S/p Sertraline 100 mg PO daily,  venlafaxine ER HCl Bloodwork from April 2025 reviewed with patient  Follow up in 3 weeks

## 2025-04-09 NOTE — PHYSICAL EXAM
[None] : none [Well groomed] : well groomed [Cooperative] : cooperative [Euthymic] : euthymic [Full] : full [Clear] : clear [Linear/Goal Directed] : linear/goal directed [None Reported] : none reported [Average] : average [WNL] : within normal limits [2 - Borderline mentally ill] : 2 - Borderline mentally ill (subtle or suspected pathology) [2 - Much improved] : 2 - Much improved  (notably better with significant reduction of symptoms; increase in the level of functioning but some symptoms remain)  [FreeTextEntry2] : not assessed, telado visit [FreeTextEntry3] : not assessed, teladoc visit [FreeTextEntry1] : well groomed, good eye contact, NAD [FreeTextEntry8] : "ok" [FreeTextEntry7] : self reflective, self aware

## 2025-04-30 NOTE — PAST MEDICAL HISTORY
[FreeTextEntry1] : One suicide attempt via overdose on Tylenol, one hospitalization [facility], without prior history of psychological or psychiatric treatment. After hospitalization at [facility], participated in DBT program and continued therapy with lAma Delia Hernandez LCSW, after which her care was transferred to resident Dr. Smith. Patient's care then transferred to Dr. Teresa.\par  \par  Medical: Chiari Malformation (with surgical intervention at age 19, with prior sx of headaches, pain with neck movements), thyroid nodules requiring biopsy, allergies with hives, ovarian cysts, current idiopathic thrombocytopenia (ITP).

## 2025-04-30 NOTE — REASON FOR VISIT
[Patient preference] : as per patient preference [Telehealth (audio & video) - Individual/Group] : This visit was provided via telehealth using real-time 2-way audio visual technology. [Medical Office: ([facility])___] : The provider was located at the medical office in [unfilled]. [Home] : The patient, [unfilled], was located at home, [unfilled], at the time of the visit. [Verbal consent obtained from patient/other participant(s)] : Verbal consent for telehealth/telephonic services obtained from patient/other participant(s) [Patient] : Patient [Follow-Up Visit] : a follow-up visit [FreeTextEntry4] : 9:30 [FreeTextEntry5] : 10:00 [FreeTextEntry1] : Therapy and medication management

## 2025-04-30 NOTE — PHYSICAL EXAM
[None] : none [Well groomed] : well groomed [Cooperative] : cooperative [Euthymic] : euthymic [Full] : full [Clear] : clear [Linear/Goal Directed] : linear/goal directed [None Reported] : none reported [Average] : average [WNL] : within normal limits [2 - Borderline mentally ill] : 2 - Borderline mentally ill (subtle or suspected pathology) [2 - Much improved] : 2 - Much improved  (notably better with significant reduction of symptoms; increase in the level of functioning but some symptoms remain)  [FreeTextEntry2] : not assessed, telado visit [FreeTextEntry3] : not assessed, teladoc visit [FreeTextEntry1] : well groomed, good eye contact, NAD [FreeTextEntry8] : "ok" [de-identified] : brighter than previously [FreeTextEntry7] : self reflective, self aware

## 2025-04-30 NOTE — SOCIAL HISTORY
[Alone] : lives alone [Employed] : employed [Never ] : never  [No Known Substance Use] : no known substance use [FreeTextEntry2] : works as   [FreeTextEntry1] : Currently domiciled alone in [facility] residence, 1 sister (who has 2 children) with strained relationship. 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

## 2025-04-30 NOTE — DISCUSSION/SUMMARY
[Plan Review] : Plan Review [Able to manage surrounding demands and opportunities] : able to manage surrounding demands and opportunities [Able to exercise self-direction] : able to exercise self-direction [Able to set and pursue goals] : able to set and pursue goals [Adherent to treatment recommendations] : adherent to treatment recommendations [Articulate] : articulate [Attempting to realize their potential] : Attempting to realize their potential [Cognitively intact] : cognitively intact [Insightful] : insightful [Creative] : creative [Intelligent] : intelligent [Motivated to participate in treatment] : motivated to participate in treatment [Motivated and ready for change] : motivated and ready for change [Health literacy] : health literacy [Motivated to maintain or improve physical health] : motivated to maintain or improve physical health [In good health] : in good health [Financially stable] : financially stable [Has vocational interests or hobbies] : has vocational interests or hobbies [Has a supportive network] : has a supportive network [Housing stability] : housing stability [English fluency] : English fluency [Connected to healthcare] : connected to healthcare [Good health literacy] : good health literacy [Access to safe outdoor spaces] : access to safe outdoor spaces [Social supports] : social supports [Mental Health] : Mental Health [Interpersonal Relationships] : Interpersonal Relationships [Initial] : Initial [every ___ months] : every [unfilled] months [Psychiatric Provider/Prescriber] : Psychiatric Provider/Prescriber [FreeTextEntry2] : 12/23/25 [FreeTextEntry5] : To continue using my DBT stills so that I don't sink back to maladaptive habits or thinking. [FreeTextEntry4] : To be able to manage when my sister does not understand me. [FreeTextEntry1] : Soledad Hawkins is a 37-year-old, female, domiciled alone in [facility], single, without children, employed , with medical history of thrombocytopenia; psychiatric history of Borderline Personality Disorder, Major Depressive Disorder, hx of suicide attempt; presenting for therapy session and medication management.  She continues to work on acceptance and distress tolerance, especially towards acceptance of her family and their behaviors/actions. Patient demonstrates continued use of DBT skills. She continues to demonstrate strong insight into borderline personality disorder and uses her skills to manage interpersonal difficulties, distress tolerance and anxiety. Self-reflective on how far she has come and is proud of sustained ability to use skills to tolerate stress. Denies anxiety, depression, SI, HI. Reviewed coping skills and some distress tolerance skills.  On follow up today pt appears euthymic, brighter than before which she attributes to improved weather. Patient denies any symptoms of decompensated ailyn, psychosis, or depression. Denies any NSSIB or SI. Denies any adverse effects on her current medication. Patient continues to be very future oriented, and treatment focused. Denies any AE on current medication regimen.   Goal: continued work towards distress tolerance and acceptance, plan to discuss small habitual changes to change her behavior in ways that she would like in regards to her finances, exercise, and eating habits  Plan: Mood and menstrual cycle tracking Consider starting cytomel vs modafanil vs stimulant for low energy/motvation and dysthymia- at this time pt requests to continue behavioral modifications Medications need to be prescribed by a Medicaid provider Continue Prozac 40 mg  Continue Trazodone 50 mg PRN qhs for insomnia (takes it several nights a week) Continue Wellbutrin  mg PO Qdaily (prescribed as 300mg + 150mg for insurance reasons); states Wellbutrin is not providing her with the "boost" it used to, may consider changing in the future Discussed taking Omega 3 fatty acids S/p Sertraline 100 mg PO daily,  venlafaxine ER HCl Bloodwork from April 2025 reviewed with patient  Follow up in 5 weeks

## 2025-04-30 NOTE — HISTORY OF PRESENT ILLNESS
[FreeTextEntry1] : Pt assessed over Teladoc for 30 minutes.   On follow up today pt appears euthymic, tolerating medications well, appears brighter with a noticeably more supple affect today. States that she feels "ok" and that her mood has been improving which she attributes to the beter weather, and going out more for walks. Notes that she has her menstrual cycle this week, and that she did not have a decompensation in her mood last week. Continues to advocate for eventual cessation of her medications once her mood has stabilized. Patient denies any symptoms of decompensated ailyn, psychosis, or depression. Denies any NSSIB or SI. Denies any adverse effects on her current medication.

## 2025-06-04 NOTE — HISTORY OF PRESENT ILLNESS
[FreeTextEntry1] : Pt assessed over Teladoc for 30 minutes.   On follow up today pt appears euthymic, continues to appear appear brighter with a noticeably more supple affect. States that she feels "good" and that she has been leveraging [employer] for help with organizing her diet and her work out routines, which has been effective for her and she has noticed a positive shift in her mood. Patient denies any symptoms of decompensated ailyn, psychosis, or depression. Denies any NSSIB or SI. Denies any adverse effects on her current medication.

## 2025-06-04 NOTE — SOCIAL HISTORY
[Alone] : lives alone [Employed] : employed [Never ] : never  [No Known Substance Use] : no known substance use [FreeTextEntry2] : works as   [FreeTextEntry1] : Currently domiciled alone in [facility] residence, 1 sister (who has 2 children) with strained relationship. 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

## 2025-06-04 NOTE — RESULTS/DATA
Clothing
[FreeTextEntry1] : 12/2/19: wnl: CBC, CMP, TSH, utox negative\par  12/2/19: H&P performed inpatient, copy in chart

## 2025-06-04 NOTE — PHYSICAL EXAM
[None] : none [Well groomed] : well groomed [Cooperative] : cooperative [Euthymic] : euthymic [Full] : full [Clear] : clear [Linear/Goal Directed] : linear/goal directed [None Reported] : none reported [Average] : average [WNL] : within normal limits [2 - Borderline mentally ill] : 2 - Borderline mentally ill (subtle or suspected pathology) [2 - Much improved] : 2 - Much improved  (notably better with significant reduction of symptoms; increase in the level of functioning but some symptoms remain)  [FreeTextEntry2] : not assessed, telado visit [FreeTextEntry3] : not assessed, teladoc visit [FreeTextEntry1] : well groomed, good eye contact, NAD [FreeTextEntry8] : "good" [de-identified] : brighter than previously [FreeTextEntry7] : self reflective, self aware

## 2025-06-04 NOTE — DISCUSSION/SUMMARY
[Plan Review] : Plan Review [Able to manage surrounding demands and opportunities] : able to manage surrounding demands and opportunities [Able to exercise self-direction] : able to exercise self-direction [Able to set and pursue goals] : able to set and pursue goals [Adherent to treatment recommendations] : adherent to treatment recommendations [Articulate] : articulate [Attempting to realize their potential] : Attempting to realize their potential [Cognitively intact] : cognitively intact [Insightful] : insightful [Creative] : creative [Intelligent] : intelligent [Motivated to participate in treatment] : motivated to participate in treatment [Motivated and ready for change] : motivated and ready for change [Health literacy] : health literacy [Motivated to maintain or improve physical health] : motivated to maintain or improve physical health [In good health] : in good health [Financially stable] : financially stable [Has vocational interests or hobbies] : has vocational interests or hobbies [Has a supportive network] : has a supportive network [Housing stability] : housing stability [English fluency] : English fluency [Connected to healthcare] : connected to healthcare [Good health literacy] : good health literacy [Access to safe outdoor spaces] : access to safe outdoor spaces [Social supports] : social supports [Mental Health] : Mental Health [Interpersonal Relationships] : Interpersonal Relationships [Initial] : Initial [every ___ months] : every [unfilled] months [Psychiatric Provider/Prescriber] : Psychiatric Provider/Prescriber [FreeTextEntry2] : 12/23/25 [FreeTextEntry5] : To continue using my DBT stills so that I don't sink back to maladaptive habits or thinking. [FreeTextEntry4] : To be able to manage when my sister does not understand me. [FreeTextEntry1] : Soledad Hawkins is a 37-year-old, female, domiciled alone in [facility], single, without children, employed , with medical history of thrombocytopenia; psychiatric history of Borderline Personality Disorder, Major Depressive Disorder, hx of suicide attempt; presenting for therapy session and medication management.  She continues to work on acceptance and distress tolerance, especially towards acceptance of her family and their behaviors/actions. Patient demonstrates continued use of DBT skills. She continues to demonstrate strong insight into borderline personality disorder and uses her skills to manage interpersonal difficulties, distress tolerance and anxiety. Self-reflective on how far she has come and is proud of sustained ability to use skills to tolerate stress. Denies anxiety, depression, SI, HI. Reviewed coping skills and some distress tolerance skills.  On follow up today pt appears euthymic, brighter than before. Patient denies any symptoms of decompensated ailyn, psychosis, or depression. Denies any NSSIB or SI. Denies any adverse effects on her current medication. Patient continues to be very future oriented, and treatment focused. Denies any AE on current medication regimen.   Goal: continued work towards distress tolerance and acceptance, plan to discuss small habitual changes to change her behavior in ways that she would like in regards to her finances, exercise, and eating habits  Plan: Continue behavioral modifications for mood (working out, diet) May consider starting cytomel vs modafanil  for low energy/motvation and dysthymia- at this time pt requests to continue behavioral modifications Medications need to be prescribed by a Medicaid provider Continue Prozac 40 mg  Continue Trazodone 50 mg PRN qhs for insomnia (takes it several nights a week) Continue Wellbutrin  mg PO Qdaily (prescribed as 300mg + 150mg for insurance reasons); states Wellbutrin is not providing her with the "boost" it used to, may consider changing in the future Discussed taking Omega 3 fatty acids S/p Sertraline 100 mg PO daily,  venlafaxine ER HCl Bloodwork from April 2025 reviewed with patient  Follow up July 16th, 9:30 VIRTUAL

## 2025-06-04 NOTE — REASON FOR VISIT
[Patient preference] : as per patient preference [Telehealth (audio & video) - Individual/Group] : This visit was provided via telehealth using real-time 2-way audio visual technology. [Medical Office: ([facility])___] : The provider was located at the medical office in [unfilled]. [Home] : The patient, [unfilled], was located at home, [unfilled], at the time of the visit. [Verbal consent obtained from patient/other participant(s)] : Verbal consent for telehealth/telephonic services obtained from patient/other participant(s) [Patient] : Patient [Follow-Up Visit] : a follow-up visit [FreeTextEntry4] : 9:30 [FreeTextEntry5] : 10:00 [FreeTextEntry1] : Therapy and medication management

## 2025-06-12 NOTE — PHYSICAL EXAM
[Normal] : mucosa is normal [Midline] : trachea located in midline position [] : septum deviated bilaterally [de-identified] : edema

## 2025-06-12 NOTE — HISTORY OF PRESENT ILLNESS
[Facial Pain] : facial pain [Headache] : headache [Facial Pressure] : facial pressure [Retro-Orbital Pain] : retro-orbital pain [Purulent Rhinorrhea] : purulent rhinorrhea [Nasal Congestion] : nasal congestion [Postnasal Drainage] : postnasal drainage [Neck Stiffness] : neck stiffness [Cough] : cough [Sore Throat] : sore throat [de-identified] : 38-year-old female patient is present today for an initial evaluation for sinus pressure. Patient states that she is having sinus problems and she feels pressure, states also that she feels clogged and stuffy. PT states that she gets a lot of pain in the face area and a lot of headaches. Gets sinus infection 2-3 times a year. Currently has facial pressure, facial pain, nasal drainage that is yellow/ green in color, PND, and sore throat. Not on any allergy medication or nasal sprays. No history of smoking. Not on any antibiotics. Symptoms has been going on for years and recently getting worse.   [Clear Rhinorrhea] : no clear rhinorrhea [Dental Pain] : no dental pain [Ear Pain] : no ear pain [Chills] : no chills [Ear Pressure] : no ear pressure [Periorbital Redness] : no periorbital redness [Fever] : no fever [Periorbital Swelling] : no periorbital swelling [Diplopia] : no diplopia [Nausea] : no nausea [Ear Fullness] : no ear fullness [Allergic Rhinitis] : no allergic rhinitis [Nasal Polyps] : no nasal polyps [Diabetes Mellitus] : no diabetes mellitus [Asthma] : no asthma [Tobacco Use] : no tobacco use [Dental Infection] : no dental infection [Dental Procedure] : no dental procedure [Facial Trauma] : no facial trauma

## 2025-06-12 NOTE — PROCEDURE
[Rigid Endoscope] : examined with a rigid endoscope [Congested] : congested [Con] : con [de-identified] : sinusitis  [FreeTextEntry6] : The following anatomic sites were directly examined in a sequential fashion: The scope was introduced in the nasal passage between the middle and inferior turbinates to exam the inferior portion of the middle meatus and the fontanelle, as well as the maxillary ostia. Next, the scope was passed medically and posteriorly to the middle turbinates to examine the sphenoethmoid recess and the superior turbinate region. turbinate hypertrophy

## 2025-07-30 NOTE — HISTORY OF PRESENT ILLNESS
[FreeTextEntry1] : Feel stable. Mood: Good Appetite: Good Sleep: Good SI: Denies HI: Denies Hospitalized 6 years ago and wants to come off her medications. Denies side effects.

## 2025-07-30 NOTE — PLAN
[FreeTextEntry5] : - Discuss behavioral modifications for mood (working out, diet) at next visit - Taper off all psychotropic medications -- Decrease to Prozac 20 mg from 40mg with plan to d/c at next visit - D/c Trazodone 50 mg PRN qhs for insomnia - alana reports she has not been taking it - Continue Wellbutrin  mg PO Qdaily (prescribed as 300mg + 150mg for insurance reasons); states Wellbutrin is not providing her with the "boost" it used to, may consider changing in the future

## 2025-07-30 NOTE — PHYSICAL EXAM
[Well groomed] : well groomed [Cooperative] : cooperative [Euthymic] : euthymic [Full] : full [Clear] : clear [Linear/Goal Directed] : linear/goal directed [Average] : average [WNL] : within normal limits

## 2025-07-30 NOTE — DISCUSSION/SUMMARY
[FreeTextEntry1] : Soledad Hawkins is a 37-year-old, female, domiciled alone in [facility], single, without children, employed , with medical history of thrombocytopenia; psychiatric history of Borderline Personality Disorder, Major Depressive Disorder, hx of suicide attempt; presenting for therapy session and medication management.  On follow up today pt appears euthymic. Patient denies depression or anxiety. Denies any SI. Denies any adverse effects on her current medication. Patient reports her last inpatient psychiatric hospitalization was over 6 years ago and that she is interested in coming off of her medications and would rather resort to natural remedies to help with low mood in the future. Patient educated on the time span of tapering off of her medications and is agreeable with the plan. Patient informed to call if mood worsens prior to next appointment while lowering meds. Patient educated on dangers of unregulated herbalist remedies and recommended to bring ideas to discuss together so that she can make an informed decision.

## 2025-07-30 NOTE — DISCUSSION/SUMMARY
[FreeTextEntry1] : Soledad Hawkins is a 37-year-old, female, domiciled alone in [facility], single, without children, employed , with medical history of thrombocytopenia; psychiatric history of Borderline Personality Disorder, Major Depressive Disorder, hx of suicide attempt; presenting for therapy session and medication management.  On follow up today pt appears euthymic. Patient denies depression or anxiety. Denies any SI. Denies any adverse effects on her current medication. Patient reports her last inpatient psychiatric hospitalization was over 6 years ago and that she is interested in coming off of her medications and would rather resort to natural remedies to help with low mood in the future. Patient educated on the time span of tapering off of her medications and is agreeable with the plan. Patient informed to call if mood worsens prior to next appointment while lowering meds. Patient educated on dangers of unregulated herbalist remedies and recommended to bring ideas to discuss together so that she can make an informed decision.

## 2025-07-30 NOTE — HISTORY OF PRESENT ILLNESS
[FreeTextEntry1] : Pt returns for f/u for sinus pressure. Pt states she has taken doxycycline, fluticasone, and levocetirizine but her sinus symptoms persist. Pt reports she can breathe well up until last week she was still very congested. Pt reports she has some pain sometimes. No further complaints

## 2025-07-30 NOTE — HISTORY OF PRESENT ILLNESS
[FreeTextEntry1] : Feel stable. Mood: Good Appetite: Good Sleep: Good SI: Denies HI: Denies Hospitalized 6 years ago and wants to come off her medications. Denies side effects.  No

## 2025-07-30 NOTE — PROCEDURE
[Rigid Endoscope] : examined with a rigid endoscope [Congested] : congested [Con] : con [FreeTextEntry6] : The following anatomic sites were directly examined in a sequential fashion: The scope was introduced in the nasal passage between the middle and inferior turbinates to exam the inferior portion of the middle meatus and the fontanelle, as well as the maxillary ostia. Next, the scope was passed medically and posteriorly to the middle turbinates to examine the sphenoethmoid recess and the superior turbinate region.